# Patient Record
Sex: MALE | Race: OTHER | Employment: FULL TIME | URBAN - METROPOLITAN AREA
[De-identification: names, ages, dates, MRNs, and addresses within clinical notes are randomized per-mention and may not be internally consistent; named-entity substitution may affect disease eponyms.]

---

## 2020-07-22 ENCOUNTER — HOSPITAL ENCOUNTER (EMERGENCY)
Age: 40
Discharge: HOME | End: 2020-07-22
Attending: EMERGENCY MEDICINE

## 2020-07-22 ENCOUNTER — APPOINTMENT (OUTPATIENT)
Dept: CT IMAGING | Age: 40
End: 2020-07-22
Attending: EMERGENCY MEDICINE

## 2020-07-22 ENCOUNTER — APPOINTMENT (OUTPATIENT)
Dept: MRI IMAGING | Age: 40
End: 2020-07-22
Attending: EMERGENCY MEDICINE

## 2020-07-22 VITALS
HEIGHT: 69 IN | SYSTOLIC BLOOD PRESSURE: 117 MMHG | OXYGEN SATURATION: 97 % | BODY MASS INDEX: 26.66 KG/M2 | DIASTOLIC BLOOD PRESSURE: 70 MMHG | WEIGHT: 180 LBS | TEMPERATURE: 97.5 F | RESPIRATION RATE: 16 BRPM | HEART RATE: 53 BPM

## 2020-07-22 DIAGNOSIS — R42 VERTIGO: ICD-10-CM

## 2020-07-22 DIAGNOSIS — R42 DIZZINESS: Primary | ICD-10-CM

## 2020-07-22 LAB
ALBUMIN SERPL-MCNC: 4.1 G/DL (ref 3.5–5)
ALBUMIN/GLOB SERPL: 1 {RATIO} (ref 1.2–3.5)
ALP SERPL-CCNC: 98 U/L (ref 50–136)
ALT SERPL-CCNC: 38 U/L (ref 12–65)
ANION GAP SERPL CALC-SCNC: 7 MMOL/L (ref 7–16)
AST SERPL-CCNC: 21 U/L (ref 15–37)
ATRIAL RATE: 56 BPM
BASOPHILS # BLD: 0 K/UL (ref 0–0.2)
BASOPHILS NFR BLD: 0 % (ref 0–2)
BILIRUB SERPL-MCNC: 0.4 MG/DL (ref 0.2–1.1)
BUN SERPL-MCNC: 12 MG/DL (ref 6–23)
CALCIUM SERPL-MCNC: 8.5 MG/DL (ref 8.3–10.4)
CALCULATED P AXIS, ECG09: 66 DEGREES
CALCULATED R AXIS, ECG10: 147 DEGREES
CALCULATED T AXIS, ECG11: 50 DEGREES
CHLORIDE SERPL-SCNC: 104 MMOL/L (ref 98–107)
CO2 SERPL-SCNC: 26 MMOL/L (ref 21–32)
CREAT SERPL-MCNC: 0.89 MG/DL (ref 0.8–1.5)
DIAGNOSIS, 93000: NORMAL
DIFFERENTIAL METHOD BLD: NORMAL
EOSINOPHIL # BLD: 0 K/UL (ref 0–0.8)
EOSINOPHIL NFR BLD: 1 % (ref 0.5–7.8)
ERYTHROCYTE [DISTWIDTH] IN BLOOD BY AUTOMATED COUNT: 13.5 % (ref 11.9–14.6)
GLOBULIN SER CALC-MCNC: 4.2 G/DL (ref 2.3–3.5)
GLUCOSE BLD STRIP.AUTO-MCNC: 98 MG/DL (ref 65–100)
GLUCOSE SERPL-MCNC: 104 MG/DL (ref 65–100)
HCT VFR BLD AUTO: 46 % (ref 41.1–50.3)
HGB BLD-MCNC: 15.3 G/DL (ref 13.6–17.2)
IMM GRANULOCYTES # BLD AUTO: 0 K/UL (ref 0–0.5)
IMM GRANULOCYTES NFR BLD AUTO: 1 % (ref 0–5)
INR BLD: 0.9 (ref 0.9–1.2)
LYMPHOCYTES # BLD: 1.3 K/UL (ref 0.5–4.6)
LYMPHOCYTES NFR BLD: 28 % (ref 13–44)
MCH RBC QN AUTO: 29.9 PG (ref 26.1–32.9)
MCHC RBC AUTO-ENTMCNC: 33.3 G/DL (ref 31.4–35)
MCV RBC AUTO: 90 FL (ref 79.6–97.8)
MONOCYTES # BLD: 0.6 K/UL (ref 0.1–1.3)
MONOCYTES NFR BLD: 12 % (ref 4–12)
NEUTS SEG # BLD: 2.8 K/UL (ref 1.7–8.2)
NEUTS SEG NFR BLD: 59 % (ref 43–78)
NRBC # BLD: 0 K/UL (ref 0–0.2)
P-R INTERVAL, ECG05: 146 MS
PLATELET # BLD AUTO: 203 K/UL (ref 150–450)
PMV BLD AUTO: 11.3 FL (ref 9.4–12.3)
POTASSIUM SERPL-SCNC: 3.8 MMOL/L (ref 3.5–5.1)
PROT SERPL-MCNC: 8.3 G/DL (ref 6.3–8.2)
PT BLD: 11.2 SECS (ref 9.6–11.6)
Q-T INTERVAL, ECG07: 414 MS
QRS DURATION, ECG06: 86 MS
QTC CALCULATION (BEZET), ECG08: 399 MS
RBC # BLD AUTO: 5.11 M/UL (ref 4.23–5.6)
SODIUM SERPL-SCNC: 137 MMOL/L (ref 136–145)
TROPONIN-HIGH SENSITIVITY: <3 PG/ML (ref 0–14)
VENTRICULAR RATE, ECG03: 56 BPM
WBC # BLD AUTO: 4.8 K/UL (ref 4.3–11.1)

## 2020-07-22 PROCEDURE — 74011000258 HC RX REV CODE- 258: Performed by: EMERGENCY MEDICINE

## 2020-07-22 PROCEDURE — 0042T CT PERF W CBF: CPT

## 2020-07-22 PROCEDURE — 74011636320 HC RX REV CODE- 636/320: Performed by: EMERGENCY MEDICINE

## 2020-07-22 PROCEDURE — 85025 COMPLETE CBC W/AUTO DIFF WBC: CPT

## 2020-07-22 PROCEDURE — 84484 ASSAY OF TROPONIN QUANT: CPT

## 2020-07-22 PROCEDURE — 74011250636 HC RX REV CODE- 250/636: Performed by: EMERGENCY MEDICINE

## 2020-07-22 PROCEDURE — 82962 GLUCOSE BLOOD TEST: CPT

## 2020-07-22 PROCEDURE — 93005 ELECTROCARDIOGRAM TRACING: CPT | Performed by: EMERGENCY MEDICINE

## 2020-07-22 PROCEDURE — 70450 CT HEAD/BRAIN W/O DYE: CPT

## 2020-07-22 PROCEDURE — 70496 CT ANGIOGRAPHY HEAD: CPT

## 2020-07-22 PROCEDURE — 80053 COMPREHEN METABOLIC PANEL: CPT

## 2020-07-22 PROCEDURE — 85610 PROTHROMBIN TIME: CPT

## 2020-07-22 PROCEDURE — 99285 EMERGENCY DEPT VISIT HI MDM: CPT

## 2020-07-22 PROCEDURE — 70551 MRI BRAIN STEM W/O DYE: CPT

## 2020-07-22 PROCEDURE — 99205 OFFICE O/P NEW HI 60 MIN: CPT | Performed by: PSYCHIATRY & NEUROLOGY

## 2020-07-22 PROCEDURE — 81003 URINALYSIS AUTO W/O SCOPE: CPT

## 2020-07-22 RX ORDER — MECLIZINE HYDROCHLORIDE 25 MG/1
25 TABLET ORAL
Qty: 30 TAB | Refills: 0 | Status: SHIPPED | OUTPATIENT
Start: 2020-07-22 | End: 2020-08-01

## 2020-07-22 RX ORDER — SODIUM CHLORIDE 0.9 % (FLUSH) 0.9 %
10 SYRINGE (ML) INJECTION
Status: COMPLETED | OUTPATIENT
Start: 2020-07-22 | End: 2020-07-22

## 2020-07-22 RX ORDER — MECLIZINE HYDROCHLORIDE 25 MG/1
25 TABLET ORAL
Status: COMPLETED | OUTPATIENT
Start: 2020-07-22 | End: 2020-07-22

## 2020-07-22 RX ADMIN — Medication 10 ML: at 10:59

## 2020-07-22 RX ADMIN — SODIUM CHLORIDE 100 ML: 900 INJECTION, SOLUTION INTRAVENOUS at 10:59

## 2020-07-22 RX ADMIN — IOPAMIDOL 100 ML: 755 INJECTION, SOLUTION INTRAVENOUS at 10:59

## 2020-07-22 RX ADMIN — MECLIZINE HYDROCHLORIDE 25 MG: 25 TABLET ORAL at 14:13

## 2020-07-22 NOTE — DISCHARGE INSTRUCTIONS
Patient Education        Mareos: Instrucciones de cuidado  Dizziness: Care Instructions  Instrucciones de cuidado  Los mareos son [place] sensación de inestabilidad o confusión en la preston. Son distintos al vértigo, gallo sensación de que la habitación gira o de que usted se mueve o . También es distinto del aturdimiento, que es la sensación de que está a punto de desmayarse. Puede resultar difícil conocer la causa de los [place]. Algunas personas se sienten mareadas cuando tienen migrañas. A veces, los episodios gripales pueden hacer que se sienta mareado. Algunas afecciones médicas, franky los problemas cardíacos o la presión arterial abdiaziz, pueden hacer que se sienta mareado. Muchos medicamentos pueden causar mareos, franky los que se usan para la presión arterial abdiaziz, el dolor o la ansiedad. Si es un medicamento el que está causando los síntomas, man médico podría recomendarle que lo cambie o deje de tomarlo. Si es un problema cardíaco, podría necesitar medicamentos para ayudar a que man corazón funcione mejor. Si no hay razón aparente para los síntomas, man médico podría sugerir vigilar y esperar leigh un tiempo para scott si los mareos desaparecen por sí solos. La atención de seguimiento es gallo parte clave de man tratamiento y seguridad. Asegúrese de hacer y acudir a todas las citas, y llame a man médico si está teniendo problemas. También es gallo buena idea saber los resultados de vani exámenes y mantener gallo lista de los medicamentos que venancio. ¿Cómo puede cuidarse en el hogar? · Si man médico le recomienda o receta medicamentos, tómelos exactamente según las indicaciones. Llame a man médico si ellie estar teniendo un problema con man medicamento. · No conduzca mientras se sienta mareado. · Trate de prevenir las caídas. Algunas medidas que puede malu son:  ? Usar tapetes antideslizantes, agregar agarraderas cerca de la airam y usar luces nocturnas.   ? Ordenar man casa de bobby manera que en los senderos no haya nada con lo que se pueda tropezar. ? Avisarles a familiares y [facility] que se ha estado sintiendo [place]. [place] les servirá para saber cómo ayudarle. ¿Cuándo debe pedir ayuda? Llame [id number] en cualquier momento que considere que necesita atención de [place]. Por ejemplo, llame si:  · Se desmayó (perdió el conocimiento). · Tiene mareos junto con síntomas de un ataque cardíaco. Estos pueden incluir:  ? Dolor de [place], o presión o gallo sensación extraña en el pecho.  ? Sudoración. ? Falta de aire. ? Náuseas o vómito. ? Dolor, presión, o gallo sensación extraña en la espalda, el marta, la mandíbula o el abdomen superior, o en emy o ambos hombros o brazos. ? Aturdimiento o debilidad repentina. ? Un latido cardíaco rápido o irregular. · Tiene síntomas de un ataque cerebral. Estos pueden incluir:  ? Entumecimiento, hormigueo, debilidad o parálisis repentinos en la andrea, el brazo o la pierna, sobre todo si ocurre en un solo lado del cuerpo. ? Cambios súbitos en la vista. ? Problemas repentinos para hablar. ? Confusión súbita o dificultad repentina para comprender frases sencillas. ? Problemas repentinos para caminar o mantener el equilibrio. ? Un dolor de [place]ston intenso y repentino, distinto a los johnson de [place]. Llame a man médico ahora mismo o busque atención médica inmediata si:  · Se siente mareado y [place], dolor de [place] o zumbido [facility] oídos. · Tiene nuevas náuseas y vómito o [address] · [place] mareos no desaparecen o regresan. Preste especial atención a los cambios en man rosie y asegúrese de comunicarse con man médico si:  · No mejora franky se esperaba. ¿Dónde puede encontrar más información en inglés? Vaya a http://inge-jorge.info/  [place] [id number] en la búsqueda para aprender más acerca de \"Mareos: Instrucciones de cuidado. \"  Revisado: 26 junio, 4640               SCDLKTU del contenido: 12.5  © 1645-5119 Healthwise, Incorporated.    Las instrucciones de cuidado fueron adaptadas bajo licencia por [employer] (which disclaims liability or warranty for this information). Si usted tiene [place] afección médica o sobre estas instrucciones, siempre pregunte a man profesional de rosie. [facility] niega toda garantía o responsabilidad por man uso de esta información. Patient Education        Othella Allegra en el hogar para el vértigo: Ejercicios  Epley Maneuver at Home for Vertigo: Exercises  Instrucciones de cuidado  El vértigo es gallo sensación de que todo gira o le da vueltas cuando usted mueve la preston. Man médico puede haberlo [employer] posiciones para ayudar a que man vértigo mejore más rápido. [place] se llama maniobra de Epley. Man médico también puede haberle pedido que xavier estos ejercicios en el hogar. Xavier los ejercicios tan a menudo franky se lo recomiende el médico. Si man vértigo [id number], man médico puede hacerle cambiar el ejercicio o decirle que lo interrumpa. Cómo hacer los ejercicios  Paso 1   1. Siéntese al borde de gallo cama o un sofá. [place] 2   1. Gire la preston a 39 grados en la dirección que le haya dicho el médico. Esta puede ser hacia el oído que le causa más vértigo. [place] 3   1. Inclínese hacia atrás [employer] esté recostado de espaldas. Tendría que seguir teniendo la Brendia Silvana a 39 grados. Wu Crowley la preston en el punto medio entre mirar hacia adelante y mirar hacia el costado. Mantenga esta posición leigh 30 segundos. Si tiene vértigo, manténgase en esta posición hasta que se detenga. [place] 4   1. Gire la preston a 80 grados hacia el oído que tiene Yogesh Kaitlin. Debe tener la punta del mentón alineada con el hombro. Mantenga esta posición leigh 30 segundos. [place] 5   1. Gire hacia el lado del oído con menos vértigo. Ahora debería estar mirando al piso. La atención de seguimiento es gallo parte clave de man tratamiento y seguridad.  Asegúrese de hacer y acudir a todas las citas, y llame a man médico si está teniendo problemas. También es gallo buena idea saber los resultados de vani exámenes y mantener gallo lista de los medicamentos que venancio. ¿Dónde puede encontrar más información en inglés? Vaya a http://www.gray.com/  Flores B302 en la búsqueda para aprender más acerca de \"Maniobra de Epley en el hogar para el vértigo: Ejercicios. \"  Revisado: 20 noviembre, 2798               BETSEY del contenido: 12.5  © 7471-3286 Healthwise, Incorporated. Las instrucciones de cuidado fueron adaptadas bajo licencia por [employer] (which disclaims liability or warranty for this information). Si usted tiene [place] afección médica o sobre estas instrucciones, siempre pregunte a man profesional de rosie. Healthwise, Incorporated niega toda garantía o responsabilidad por man uso de esta información.

## 2020-07-22 NOTE — ED TRIAGE NOTES
services being used as pt does not speak [address] Pt states, \"I feel like I could faint, Im really dizzy\". States this started yesterday. States it feel likes its getting worse. States nothing seems to make it better or worse. Denies nausea or vomiting. States when he moves it feels bad. States if head is still and he is not moving he is not dizzy. Pt did appear dizzy when trying to walk into triage room.

## 2020-07-22 NOTE — PROGRESS NOTES
Assisted with Neurology assessment. Hospital  assisted over-the-phone, providing language services for CT instructions. Please call [phone] (DT) or [phone] (ES) for all requests.      Shawna Chew  CERTIFIED HEALTHCARE INTERPRETERTM ([address] [place] [address])  Language Services Department   [facility] [address] [place] [address]  [phone] (phone)

## 2020-07-22 NOTE — ED NOTES
I have reviewed discharge instructions with the patient. The patient verbalized understanding. Patient left ED via Discharge Method: wheelchair to Home with family    Opportunity for questions and clarification provided. Patient given 1 scripts. To continue your aftercare when you leave the hospital, you may receive an automated call from our care team to check in on how you are doing. This is a free service and part of our promise to provide the best care and service to meet your aftercare needs.  If you have questions, or wish to unsubscribe from this service please call [phone]. Thank you for Choosing our [facility] Emergency Department.

## 2020-07-22 NOTE — ED PROVIDER NOTES
Presents with complaint of dizziness and feeling like he is going to fall whenever he walks. Patient denies any nausea or vomiting. He states turning his head makes it worse. He is never had this before. He is seen with the  on the telephone in triage. He states the symptoms have gotten gradually worse. It started at noon yesterday. Code S called. Nursing describes gait ataxia on presentation. The history is provided by the patient. A  was used. Dizziness   This is a new problem. The current episode started yesterday. The problem has been gradually worsening. There was no focality noted. Primary symptoms include loss of balance. Pertinent negatives include no focal weakness, no slurred speech, no speech difficulty and no mental status change. There has been no fever. Pertinent negatives include no vomiting, no altered mental status, no confusion, no headaches and no nausea. History reviewed. No pertinent past medical history. History reviewed. No pertinent surgical history. History reviewed. No pertinent family history.     Social History     Socioeconomic History    Marital status:      Spouse name: Not on file    Number of children: Not on file    Years of education: Not on file    Highest education level: Not on file   Occupational History    Not on file   Social Needs    Financial resource strain: Not on file    Food insecurity     Worry: Not on file     Inability: Not on file    Transportation needs     Medical: Not on file     Non-medical: Not on file   Tobacco Use    Smoking status: Never Smoker    Smokeless tobacco: Never Used   Substance and Sexual Activity    Alcohol use: Never     Frequency: Never    Drug use: Never    Sexual activity: Not on file   Lifestyle    Physical activity     Days per week: Not on file     Minutes per session: Not on file    Stress: Not on file   Relationships    Social connections     Talks on phone: Not on file     Gets together: Not on file     Attends Synagogue service: Not on file     Active member of club or organization: Not on file     Attends meetings of clubs or organizations: Not on file     Relationship status: Not on file    Intimate partner violence     Fear of current or ex partner: Not on file     Emotionally abused: Not on file     Physically abused: Not on file     Forced sexual activity: Not on file   Other Topics Concern    Not on file   Social History Narrative    Not on file         ALLERGIES: Patient has no known allergies. Review of Systems   Constitutional: Negative for chills and fever. Gastrointestinal: Negative for nausea and vomiting. Neurological: Positive for dizziness and loss of balance. Negative for focal weakness, speech difficulty and headaches. Psychiatric/Behavioral: Negative for confusion. All other systems reviewed and are negative. Vitals:    07/22/20 1026   BP: 127/88   Pulse: 73   Resp: 16   Temp: 97.5 °F (36.4 °C)   SpO2: 98%   Weight: 81.6 kg (180 lb)   Height: 5' 9\" (1.753 m)            Physical Exam  Vitals signs and nursing note reviewed. Constitutional:       General: He is not in acute distress. Appearance: He is well-developed. He is not diaphoretic. HENT:      Head: Normocephalic and atraumatic. Right Ear: Tympanic membrane and ear canal normal.      Left Ear: Tympanic membrane and ear canal normal.   Eyes:      General:         Right eye: No discharge. Left eye: No discharge. Conjunctiva/sclera: Conjunctivae normal.      Comments: Mild nystagmus     Neck:      Musculoskeletal: Normal range of motion and neck supple. Cardiovascular:      Rate and Rhythm: Normal rate and regular rhythm. Pulmonary:      Effort: Pulmonary effort is normal. No respiratory distress. Breath sounds: Normal breath sounds. Abdominal:      General: There is no distension. Palpations: Abdomen is soft.       Tenderness: There is no abdominal tenderness. Musculoskeletal: Normal range of motion. Skin:     General: Skin is warm and dry. Capillary Refill: Capillary refill takes less than 2 seconds. Neurological:      Mental Status: He is alert and oriented to person, place, and time. Cranial Nerves: No cranial nerve deficit. Comments: NIH 0   Psychiatric:         Mood and Affect: Mood normal.         Behavior: Behavior normal.          MDM  Number of Diagnoses or Management Options  Dizziness:   Diagnosis management comments: D/w Dr. Bimal Singer. If MRI neg then can go home. Likely BPV. Home with antivert.          Amount and/or Complexity of Data Reviewed  Clinical lab tests: ordered and reviewed  Tests in the radiology section of CPT®: ordered and reviewed  Discuss the patient with other providers: yes  Independent visualization of images, tracings, or specimens: yes    Risk of Complications, Morbidity, and/or Mortality  Presenting problems: high  Diagnostic procedures: moderate  Management options: moderate    Patient Progress  Patient progress: improved         Procedures

## 2020-07-22 NOTE — ED NOTES
Code S called.  in triage. Erica NP with neuro to triage as well. Pt taken to CT by [facility]. NIHSS 0 in triage.

## 2020-07-22 NOTE — PROGRESS NOTES
Consult    Patient: Radha Claire MRN: [id number]     YOB: 1980  Age: 44 y.o. Sex: male      Subjective:      Radha Claire is a 44 y.o. male who is being seen for code S. He c/o dizziness only. He denies weakness, n/v, head trauma, or LOC. The patient was last known normal at 1200 7/21/2020. The patient presented to [facility] ED. A code S was called at 10:35. Neurology arrived to the bedside at 10:36. Initial NIHSS was 0. A CT of the head was obtained and no acute intracranial abnormality identified. History reviewed. No pertinent past medical history. History reviewed. No pertinent surgical history. History reviewed. No pertinent family history. Social History     Tobacco Use    Smoking status: Never Smoker    Smokeless tobacco: Never Used   Substance Use Topics    Alcohol use: Never     Frequency: Never           No Known Allergies    Review of Systems:  Not obtained due to emergent situation         Objective:     Vitals:    07/22/20 1026   BP: 127/88   Pulse: 73   Resp: 16   Temp: 97.5 °F (36.4 °C)   SpO2: 98%   Weight: 180 lb (81.6 kg)   Height: 5' 9\" (1.753 m)        Physical Exam:  General - Well developed, well nourished, in no apparent distress. Pleasant and conversent. HEENT - Normocephalic, atraumatic. Conjunctiva, tympanic membranes, and oropharynx are clear. Neck - Supple without masses, no bruits   Cardiovascular - Regular rate and rhythm. Extremities - Peripheral pulses intact. No edema and no rashes. Neurological examination - Comprehension, attention, memory and reasoning are intact. Language and speech are normal.   On cranial nerve examination, (II, III, IV, VI) pupils are equal, round, and reactive to light. Fundoscopic exam is normal. Visual acuity is adequate. Visual fields are full to finger confrontation. Extraocular motility is normal. (V, VII) Face is symmetric and sensation is intact to light touch. (VIII) Hearing is intact.  (IX, X) Palate and uvula elevate symmetrically. Voice is normal. (XI) Shoulder shrug is strong and equal bilaterally. (XII)Tongue is midline. Motor examination - There is normal muscle tone and bulk. Power is full throughout. Muscle stretch reflexes are normoactive and there are no pathological reflexes present. Plantar response is flexor. Sensation is intact to light touch, pinprick, vibration and proprioception in all extremities. Cerebellar examination is normal. Gait and stance are normal.     NIHSS   NIHSS Score:   1a-Level of Consciousness 0  1b-What is Month/Age 0  1c-Open/Close Eyes&Hand 0  2 -Best Gaze 0  3 -Visual Fields 0  4 -Facial Palsy 0  5a-Motor-Left Arm 0  5b-Motor-Right Arm 0  6a-Motor-Left Leg 0  6b-Motor-Right Leg 0  7 -Limb Ataxia 0  8 -Sensory 0  9 -Best Language 0  10-Dysarthria 0  11-Extinction/Inattention 0        Assessment:     Farheen Valdez seen as a code S with c/o dizziness. Initial NIHSS was 0. CT of the head was obtained and no acute intracranial abnormality was idendified. CTA of head neck was obtained. The patient was not a candidate for alteplase because NIH of 0 . The patient was not a candidate for mechanical thrombectomy, as no large vessel occlusion was identified on CTA. Plan:     Evaluate for alternate etiologies of dizziness such as electrolyte abnormalities, cardiac etiology, or HENT causes. My impression is that this is a peripheral form of vertigo but the HINTS exam was normal. Will obtain an MRI to rule out stroke      Signed By: Tim Catalan NP     July 22, 2020      I spent 45 minutes with the patient. Over 50% of that time was spent face-to-face with the patient and family.

## 2020-07-22 NOTE — PROGRESS NOTES
assisted over the phone with MRI Questionnaire and Consent.      Marilyn Garrett  CERTIFIED HEALTHCARE INTERPRETERTM ([address] [place] [address])  Language Services Department   [address]  [phone] (phone)